# Patient Record
Sex: FEMALE | Race: ASIAN | NOT HISPANIC OR LATINO | Employment: FULL TIME | URBAN - METROPOLITAN AREA
[De-identification: names, ages, dates, MRNs, and addresses within clinical notes are randomized per-mention and may not be internally consistent; named-entity substitution may affect disease eponyms.]

---

## 2017-12-20 ENCOUNTER — TRANSFERRED RECORDS (OUTPATIENT)
Dept: HEALTH INFORMATION MANAGEMENT | Facility: CLINIC | Age: 62
End: 2017-12-20

## 2017-12-28 ENCOUNTER — TRANSFERRED RECORDS (OUTPATIENT)
Dept: HEALTH INFORMATION MANAGEMENT | Facility: CLINIC | Age: 62
End: 2017-12-28

## 2017-12-29 ENCOUNTER — MEDICAL CORRESPONDENCE (OUTPATIENT)
Dept: HEALTH INFORMATION MANAGEMENT | Facility: CLINIC | Age: 62
End: 2017-12-29

## 2017-12-29 ENCOUNTER — TRANSFERRED RECORDS (OUTPATIENT)
Dept: HEALTH INFORMATION MANAGEMENT | Facility: CLINIC | Age: 62
End: 2017-12-29

## 2018-01-24 ENCOUNTER — OFFICE VISIT (OUTPATIENT)
Dept: FAMILY MEDICINE | Facility: CLINIC | Age: 63
End: 2018-01-24
Attending: FAMILY MEDICINE
Payer: COMMERCIAL

## 2018-01-24 VITALS
WEIGHT: 131.3 LBS | HEART RATE: 65 BPM | HEIGHT: 64 IN | BODY MASS INDEX: 22.42 KG/M2 | DIASTOLIC BLOOD PRESSURE: 77 MMHG | SYSTOLIC BLOOD PRESSURE: 133 MMHG

## 2018-01-24 DIAGNOSIS — M81.0 AGE-RELATED OSTEOPOROSIS WITHOUT CURRENT PATHOLOGICAL FRACTURE: Primary | ICD-10-CM

## 2018-01-24 DIAGNOSIS — F43.23 ADJUSTMENT DISORDER WITH MIXED ANXIETY AND DEPRESSED MOOD: ICD-10-CM

## 2018-01-24 LAB
ANION GAP SERPL CALCULATED.3IONS-SCNC: 4 MMOL/L (ref 3–14)
BUN SERPL-MCNC: 10 MG/DL (ref 7–30)
CALCIUM SERPL-MCNC: 9.6 MG/DL (ref 8.5–10.1)
CHLORIDE SERPL-SCNC: 107 MMOL/L (ref 94–109)
CO2 SERPL-SCNC: 31 MMOL/L (ref 20–32)
CREAT SERPL-MCNC: 0.88 MG/DL (ref 0.52–1.04)
GFR SERPL CREATININE-BSD FRML MDRD: 65 ML/MIN/1.7M2
GLUCOSE SERPL-MCNC: 88 MG/DL (ref 70–99)
HGB BLD-MCNC: 13.7 G/DL (ref 11.7–15.7)
POTASSIUM SERPL-SCNC: 4.4 MMOL/L (ref 3.4–5.3)
SODIUM SERPL-SCNC: 142 MMOL/L (ref 133–144)
TSH SERPL DL<=0.005 MIU/L-ACNC: 2.67 MU/L (ref 0.4–4)

## 2018-01-24 PROCEDURE — G0463 HOSPITAL OUTPT CLINIC VISIT: HCPCS | Mod: ZF

## 2018-01-24 PROCEDURE — 84443 ASSAY THYROID STIM HORMONE: CPT | Performed by: FAMILY MEDICINE

## 2018-01-24 PROCEDURE — 80048 BASIC METABOLIC PNL TOTAL CA: CPT | Performed by: FAMILY MEDICINE

## 2018-01-24 PROCEDURE — 85018 HEMOGLOBIN: CPT | Performed by: FAMILY MEDICINE

## 2018-01-24 PROCEDURE — 82306 VITAMIN D 25 HYDROXY: CPT | Performed by: FAMILY MEDICINE

## 2018-01-24 PROCEDURE — 36415 COLL VENOUS BLD VENIPUNCTURE: CPT | Performed by: FAMILY MEDICINE

## 2018-01-24 RX ORDER — ALENDRONATE SODIUM 70 MG/1
TABLET ORAL
Qty: 12 TABLET | Refills: 1 | Status: SHIPPED | OUTPATIENT
Start: 2018-01-24

## 2018-01-24 NOTE — MR AVS SNAPSHOT
After Visit Summary   1/24/2018    Anum Forbes    MRN: [id number]           Patient Information     Date Of Birth          1955        Visit Information        Provider Department      1/24/2018 2:20 PM Vazquez Ibarra MD [facility]        Today's Diagnoses     Age-related osteoporosis without current pathological fracture    -  1    Adjustment disorder with mixed anxiety and depressed mood           Follow-ups after your visit        Additional Services     MENTAL HEALTH REFERRAL  - Adult; Psychiatry and Medication Management; Psychiatry; Other: Not Listed - Enter Referral Details in Scheduling Comments Below; Patient call to schedule       All scheduling is subject to the client's specific insurance plan & benefits, provider/location availability, and provider clinical specialities.  Please arrive 15 minutes early for your first appointment and bring your completed paperwork.   Refer to Dr. Elmer Morris, psychiatrist or psychologist at [place]    Please be aware that coverage of these services is subject to the terms and limitations of your health insurance plan.  Call member services at your health plan with any benefit or coverage questions.                  Follow-up notes from your care team     Return in about 3 months (around 4/24/2018).      Who to contact     Please call your clinic at [phone] to:    Ask questions about your health    Make or cancel appointments    Discuss your medicines    Learn about your test results    Speak to your doctor   If you have compliments or concerns about an experience at your clinic, or if you wish to file a complaint, please contact [facility] Patient Relations at [phone] or email us at [email or web address]         Additional Information About Your Visit        MyChart Information     [employer] is an electronic gateway that provides easy, online access to your medical records.  "With [facility], you can request a clinic appointment, read your test results, renew a prescription or communicate with your care team.     To sign up for [facility] [facility] visit the website at [email or web address]   You will be asked to enter the access code listed below, as well as some personal information. Please follow the directions to create your username and password.     Your access code is: [email or web address]  Expires: 2018  6:30 AM     Your access code will  in 90 days. If you need help or a new code, please contact your [facility] or call [phone] for assistance.        Care EveryWhere ID     This is your Care EveryWhere ID. This could be used by other organizations to access your [facility] medical records  [id number]        Your Vitals Were     Pulse Height BMI (Body Mass Index)             65 1.626 m (5' 4.02\") 22.53 kg/m2          Blood Pressure from Last 3 Encounters:   18 133/77    Weight from Last 3 Encounters:   18 59.6 kg (131 lb 4.8 oz)   13 59 kg (130 lb)   13 59 kg (130 lb)              We Performed the Following     25- OH-Vitamin D     Basic Metabolic Panel     Hemoglobin     MENTAL HEALTH REFERRAL  - Adult; Psychiatry and Medication Management; Psychiatry; Other: Not Listed - Enter Referral Details in Scheduling Comments Below; Patient call to schedule     TSH with free T4 reflex          Today's Medication Changes          These changes are accurate as of 18 11:59 PM.  If you have any questions, ask your nurse or doctor.               Start taking these medicines.        Dose/Directions    alendronate 70 MG tablet   Commonly known as:  FOSAMAX   Used for:  Age-related osteoporosis without current pathological fracture   Started by:  Vazquez Ibarra MD        Take 1 tablet (70 mg) by mouth with 8oz water every 7 days 30 minutes before breakfast and remain upright during this time.   Quantity:  12 tablet   Refills:  1          "   Where to get your medicines      These medications were sent to [id number] - [place], [place] - [address]  [address], [facility] [address]     Phone:  [phone]     alendronate 70 MG tablet                Primary Care Provider Fax #    Physician No Ref-Primary [phone]       No address on file        Equal Access to Services     [facility] Center[facility] : Hadii briana ku hadasho Soomaali, waaxda luqadaha, qaybta kaalmada adeegyada, benjamin erickson hayaan adekristen aliciasowmyaann devi . So [facility] [phone].    ATENCIÓN: Si habla español, tiene a alarcon disposición servicios gratuitos de asistencia lingüística. Llame al [phone].    We comply with applicable federal civil rights laws and [place] laws. We do not discriminate on the basis of race, color, national origin, age, disability, sex, sexual orientation, or gender identity.            Thank you!     Thank you for choosing [facility]  for your care. Our goal is always to provide you with excellent care. Hearing back from our patients is one way we can continue to improve our services. Please take a few minutes to complete the written survey that you may receive in the mail after your visit with us. Thank you!             Your Updated Medication List - Protect others around you: Learn how to safely use, store and throw away your medicines at www.disposemymeds.org.          This list is accurate as of 1/24/18 11:59 PM.  Always use your most recent med list.                   Brand Name Dispense Instructions for use Diagnosis    alendronate 70 MG tablet    FOSAMAX    12 tablet    Take 1 tablet (70 mg) by mouth with 8oz water every 7 days 30 minutes before breakfast and remain upright during this time.    Age-related osteoporosis without current pathological fracture       desoximetasone 0.25 % Oint ointment    TOPICORT    60 g    Apply  topically 2 times daily. Please compound with 75% desoximetasome 0.25% cream.  1:3 ointment  to cream.    Eczema       gabapentin 100 MG capsule    NEURONTIN    189 capsule    One cap TID for 7 days, then 2 caps TID for 7 days and then 3 TID    Lumbago       Urea 40 % Crea     60 g    Daily as needed as instructed at your appointment.    Dermatitis       Urea in Ammonium Lactate 20 % Crea     45 g    Externally apply  topically At Bedtime. Small amount to feet daily.    Eczema

## 2018-01-24 NOTE — LETTER
1/24/2018       RE: Anum Forbes  [address]  [facility] [address]     Dear Colleague,    Thank you for referring your patient, Anum Forbes, to the [facility] at [facility]. Please see a copy of my visit note below.    HPI  Pt. Is seen usually at [facility] for primary care, here for bone health, mood and cyst    1. Osteoporosis. Pt. Was seen by Stacy Iyer NP at [facility] for routine care, recommend DEXA scan, had on 12/28/17  DEXA scan done  in [place], pt. Brought in for review and reviewed with patient  L hip T score -2.54, R hip -2.34, lumbar spine -0.8   LMP age 57. Eats very little dairy, no lactose intolerance. Does not take Vitamin D supplement. Never smoker  She has known degenerative disc disease in back, but no thyroid issues or HTN, takes no regular medications, no history of prolonged steroid use.  Mother had fracture (hip?) after MVA at older age  No personal history fractures.   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 She experiences increased anxiety,getting used to cultural differences, especially in workplace. new culture, etc.    She was let go from work 4 days ago, with  susequent depressive symptoms: diffculty falling and staying asleep, low appetite, worthlessness, feeling slow, trouble concentrate. +SI. She has no plans, thinks not worth it, but feels hopeless.  She lives with chandra,  supportive and will put her on his insuarance, recommended that she continue to  Work or engage in training. She can apply for unemployment. She is a [place] citizen, and does not anticipate financial concerns  She does not tend to  go out or talk to people a lot; has in-laws  Currently taking OTC sleeping pill each night  No ETOH use, no substances    Past  "history:  Prior episode depression 2012, related to work. Took medication, unknown type, took off 2 months  Has had therapist in past.     2012, had depression episode again related to work situation  Took medications, took 2 months off.  What sleeping pill OTC, 1 tab each night  ETOH--no  No substances   GAD7=14, predates job loss  Therapist in past--  No history of manic symptoms    Past Medical History:   Diagnosis Date     Depression      Active medical problems  Eczema        ROS  + superficial mass in L wrist nontender   12 point ROS negative except where noted above      Physical Exam  Blood pressure 133/77, pulse 65, height 1.626 m (5' 4.02\"), weight 59.6 kg (131 lb 4.8 oz).    Alertness:  alert   Appearance:  well groomed  Behavior/Demeanor:  cooperative and pleasant, with good  eye contact.  Speech:  normal  Psychomotor:  normal or unremarkable and sits forward or near front of chair    Mood:  depressed  Affect:  tearful and appropriate and was congruent to speech content.  Thought Process/Associations: unremarkable   Thought Content: devoid of  suicidal ideation without plan; without intent [details in Interim History].   Perception: devoid of  auditory hallucinations without commands [details in Interim History]  Insight:  good.  Judgment: good.  Attention/Concentration:  Normal  Language:  Intact  Fund of Knowledge:  Above average.    Memory:  Immediate recall intact, Short-term memory intact and Long-term memory intact.      These cognitive functions grossly appear as described, but were not formally tested.    3-4mm superficial nontender discrete mass volar surface of wrist    A/P  1. Osteoporosis  Discussed the nature and treatment of osteoporosis with patient. Risk factors include post menopausal status, ethnicity, lack of Vit D/calcium in diet, possible family history.  Counseled about treatment options. Will start Fosamax 70 mg daily, instructed patent how to take. Will check Vit D, BMP  If " normal, recommend 600-1000 units daily, along with regular weight bearing activity    2. Adjustment disorder with significant anxiety and depressive symptoms  More severe depressive symptoms since job loss. While +SI present, no plans and no history of attempts  Counseled about nature and treatment of mood disorders, options for treatment. Will check TSH, Hgb,   referral to Dr. Elmer Morris, psychiatrist at [place].   3. Ganglion cyst--educated patient and reassurance given, no intervention desired at this time    If any trouble starting mental health care at [place], to call  Otherwise f/u 3 months for osteoporosis      Again, thank you for allowing me to participate in the care of your patient.      Sincerely,    Vazquez Ibarra MD

## 2018-01-24 NOTE — LETTER
2/7/2018         Anum Forbes   [address] [place] [facility] [address]        Dear Ms. Forbes:    The results of your recent test(s) listed below were normal, except your Vitamin D level is borderline low. I would recommend that you take a 1000 unit Vitamin d supplement daily.    Results for orders placed or performed in visit on 01/24/18   TSH with free T4 reflex   Result Value Ref Range    TSH 2.67 0.40 - 4.00 mU/L   Hemoglobin   Result Value Ref Range    Hemoglobin 13.7 11.7 - 15.7 g/dL   25- OH-Vitamin D   Result Value Ref Range    Vitamin D Deficiency screening 21 20 - 75 ug/L   Basic Metabolic Panel   Result Value Ref Range    Sodium 142 133 - 144 mmol/L    Potassium 4.4 3.4 - 5.3 mmol/L    Chloride 107 94 - 109 mmol/L    Carbon Dioxide 31 20 - 32 mmol/L    Anion Gap 4 3 - 14 mmol/L    Glucose 88 70 - 99 mg/dL    Urea Nitrogen 10 7 - 30 mg/dL    Creatinine 0.88 0.52 - 1.04 mg/dL    GFR Estimate 65 >60 mL/min/1.7m2    GFR Estimate If Black 79 >60 mL/min/1.7m2    Calcium 9.6 8.5 - 10.1 mg/dL         Please note that test explanations are brief and do not reflect all diagnostic uses.  If you have any questions or concerns, please call the clinic at [phone].      Sincerely,      Vazquez Ibarra MD

## 2018-01-24 NOTE — PROGRESS NOTES
HPI  Pt. Is seen usually at [facility] for primary care, here for bone health, mood and cyst    1. Osteoporosis. Pt. Was seen by Stacy Iyer NP at [facility] for routine care, recommend DEXA scan, had on 12/28/17  DEXA scan done  in [place], pt. Brought in for review and reviewed with patient  L hip T score -2.54, R hip -2.34, lumbar spine -0.8   LMP age 57. Eats very little dairy, no lactose intolerance. Does not take Vitamin D supplement. Never smoker  She has known degenerative disc disease in back, but no thyroid issues or HTN, takes no regular medications, no history of prolonged steroid use.  Mother had fracture (hip?) after MVA at older age  No personal history fractures.   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 She experiences increased anxiety,getting used to cultural differences, especially in workplace. new culture, etc.    She was let go from work 4 days ago, with  susequent depressive symptoms: diffculty falling and staying asleep, low appetite, worthlessness, feeling slow, trouble concentrate. +SI. She has no plans, thinks not worth it, but feels hopeless.  She lives with chandra,  supportive and will put her on his insuarance, recommended that she continue to  Work or engage in training. She can apply for unemployment. She is a [place], and does not anticipate financial concerns  She does not tend to  go out or talk to people a lot; has in-laws  Currently taking OTC sleeping pill each night  No ETOH use, no substances    Past history:  Prior episode depression 2012, related to work. Took medication, unknown type, took off 2 months  Has had therapist in past.     2012, had depression episode again related to work situation  Took medications, took 2 months off.  What sleeping pill OTC, 1 tab each night  ETOH--no  No  "substances   GAD7=14, predates job loss  Therapist in past--  No history of manic symptoms    Past Medical History:   Diagnosis Date     Depression      Active medical problems  Eczema        ROS  + superficial mass in L wrist nontender   12 point ROS negative except where noted above      Physical Exam  Blood pressure 133/77, pulse 65, height 1.626 m (5' 4.02\"), weight 59.6 kg (131 lb 4.8 oz).    Alertness:  alert   Appearance:  well groomed  Behavior/Demeanor:  cooperative and pleasant, with good  eye contact.  Speech:  normal  Psychomotor:  normal or unremarkable and sits forward or near front of chair    Mood:  depressed  Affect:  tearful and appropriate and was congruent to speech content.  Thought Process/Associations: unremarkable   Thought Content: devoid of  suicidal ideation without plan; without intent [details in Interim History].   Perception: devoid of  auditory hallucinations without commands [details in Interim History]  Insight:  good.  Judgment: good.  Attention/Concentration:  Normal  Language:  Intact  Fund of Knowledge:  Above average.    Memory:  Immediate recall intact, Short-term memory intact and Long-term memory intact.      These cognitive functions grossly appear as described, but were not formally tested.    3-4mm superficial nontender discrete mass volar surface of wrist    A/P  1. Osteoporosis  Discussed the nature and treatment of osteoporosis with patient. Risk factors include post menopausal status, ethnicity, lack of Vit D/calcium in diet, possible family history.  Counseled about treatment options. Will start Fosamax 70 mg daily, instructed patent how to take. Will check Vit D, BMP  If normal, recommend 600-1000 units daily, along with regular weight bearing activity    2. Adjustment disorder with significant anxiety and depressive symptoms  More severe depressive symptoms since job loss. While +SI present, no plans and no history of attempts  Counseled about nature and treatment " of mood disorders, options for treatment. Will check TSH, Hgb,   referral to Dr. Elmer Morris, psychiatrist at [facility].   3. Ganglion cyst--educated patient and reassurance given, no intervention desired at this time    If any trouble starting mental health care at [place], to call  Otherwise f/u 3 months for osteoporosis

## 2018-01-24 NOTE — LETTER
Date:February 1, 2018      Patient was self referred, no letter generated. Do not send.        [facility] Health Information

## 2018-01-25 LAB — DEPRECATED CALCIDIOL+CALCIFEROL SERPL-MC: 21 UG/L (ref 20–75)

## 2018-12-18 ENCOUNTER — ANCILLARY PROCEDURE (OUTPATIENT)
Dept: MAMMOGRAPHY | Facility: CLINIC | Age: 63
End: 2018-12-18
Payer: COMMERCIAL

## 2018-12-18 ENCOUNTER — ANCILLARY PROCEDURE (OUTPATIENT)
Dept: BONE DENSITY | Facility: CLINIC | Age: 63
End: 2018-12-18
Payer: COMMERCIAL

## 2018-12-18 DIAGNOSIS — Z00.00 ENCOUNTER FOR WELLNESS EXAMINATION: ICD-10-CM

## 2018-12-18 DIAGNOSIS — M81.0 OSTEOPOROSIS: ICD-10-CM

## 2020-10-22 ENCOUNTER — MEDICAL CORRESPONDENCE (OUTPATIENT)
Dept: HEALTH INFORMATION MANAGEMENT | Facility: CLINIC | Age: 65
End: 2020-10-22

## 2020-10-22 ENCOUNTER — TRANSFERRED RECORDS (OUTPATIENT)
Dept: HEALTH INFORMATION MANAGEMENT | Facility: CLINIC | Age: 65
End: 2020-10-22

## 2020-10-22 ENCOUNTER — TRANSCRIBE ORDERS (OUTPATIENT)
Dept: GASTROENTEROLOGY | Facility: CLINIC | Age: 65
End: 2020-10-22

## 2020-10-22 DIAGNOSIS — Z00.00 ENCOUNTER FOR WELL ADULT EXAM WITHOUT ABNORMAL FINDINGS: Primary | ICD-10-CM

## 2020-10-23 ENCOUNTER — TRANSFERRED RECORDS (OUTPATIENT)
Dept: HEALTH INFORMATION MANAGEMENT | Facility: CLINIC | Age: 65
End: 2020-10-23

## 2020-10-25 DIAGNOSIS — Z11.59 ENCOUNTER FOR SCREENING FOR OTHER VIRAL DISEASES: Primary | ICD-10-CM

## 2020-12-04 ENCOUNTER — TELEPHONE (OUTPATIENT)
Dept: GASTROENTEROLOGY | Facility: OUTPATIENT CENTER | Age: 65
End: 2020-12-04

## 2020-12-08 DIAGNOSIS — Z11.59 ENCOUNTER FOR SCREENING FOR OTHER VIRAL DISEASES: ICD-10-CM

## 2020-12-08 PROCEDURE — U0003 INFECTIOUS AGENT DETECTION BY NUCLEIC ACID (DNA OR RNA); SEVERE ACUTE RESPIRATORY SYNDROME CORONAVIRUS 2 (SARS-COV-2) (CORONAVIRUS DISEASE [COVID-19]), AMPLIFIED PROBE TECHNIQUE, MAKING USE OF HIGH THROUGHPUT TECHNOLOGIES AS DESCRIBED BY CMS-2020-01-R: HCPCS | Performed by: INTERNAL MEDICINE

## 2020-12-09 ENCOUNTER — TELEPHONE (OUTPATIENT)
Dept: GASTROENTEROLOGY | Facility: CLINIC | Age: 65
End: 2020-12-09

## 2020-12-09 LAB
SARS-COV-2 RNA SPEC QL NAA+PROBE: NOT DETECTED
SPECIMEN SOURCE: NORMAL

## 2020-12-09 NOTE — TELEPHONE ENCOUNTER
Pt requests communication via unencrypted e-mail. This includes colonoscopy prep instructions.  Pt acknowledges that they have agreed to accept the risks and receive unencrypted communications for this incidence.

## 2020-12-11 ENCOUNTER — HOSPITAL ENCOUNTER (OUTPATIENT)
Facility: AMBULATORY SURGERY CENTER | Age: 65
Discharge: HOME | End: 2020-12-11
Attending: INTERNAL MEDICINE | Admitting: INTERNAL MEDICINE
Payer: COMMERCIAL

## 2020-12-11 VITALS
SYSTOLIC BLOOD PRESSURE: 117 MMHG | WEIGHT: 135 LBS | BODY MASS INDEX: 22.49 KG/M2 | HEART RATE: 54 BPM | OXYGEN SATURATION: 100 % | TEMPERATURE: 97.3 F | DIASTOLIC BLOOD PRESSURE: 65 MMHG | HEIGHT: 65 IN | RESPIRATION RATE: 16 BRPM

## 2020-12-11 LAB — COLONOSCOPY: NORMAL

## 2020-12-11 PROCEDURE — 45378 DIAGNOSTIC COLONOSCOPY: CPT

## 2020-12-11 PROCEDURE — 45378 DIAGNOSTIC COLONOSCOPY: CPT | Performed by: INTERNAL MEDICINE

## 2020-12-11 RX ORDER — LIDOCAINE 40 MG/G
CREAM TOPICAL
Status: DISCONTINUED | OUTPATIENT
Start: 2020-12-11 | End: 2020-12-11 | Stop reason: HOSPADM

## 2020-12-11 RX ORDER — ONDANSETRON 2 MG/ML
4 INJECTION INTRAMUSCULAR; INTRAVENOUS
Status: DISCONTINUED | OUTPATIENT
Start: 2020-12-11 | End: 2020-12-11 | Stop reason: HOSPADM

## 2020-12-11 RX ORDER — PROCHLORPERAZINE MALEATE 10 MG
10 TABLET ORAL EVERY 6 HOURS PRN
Status: DISCONTINUED | OUTPATIENT
Start: 2020-12-11 | End: 2020-12-12 | Stop reason: HOSPADM

## 2020-12-11 RX ORDER — ONDANSETRON 4 MG/1
4 TABLET, ORALLY DISINTEGRATING ORAL EVERY 6 HOURS PRN
Status: DISCONTINUED | OUTPATIENT
Start: 2020-12-11 | End: 2020-12-12 | Stop reason: HOSPADM

## 2020-12-11 RX ORDER — ONDANSETRON 2 MG/ML
4 INJECTION INTRAMUSCULAR; INTRAVENOUS EVERY 6 HOURS PRN
Status: DISCONTINUED | OUTPATIENT
Start: 2020-12-11 | End: 2020-12-12 | Stop reason: HOSPADM

## 2020-12-11 RX ORDER — NALOXONE HYDROCHLORIDE 0.4 MG/ML
0.2 INJECTION, SOLUTION INTRAMUSCULAR; INTRAVENOUS; SUBCUTANEOUS
Status: DISCONTINUED | OUTPATIENT
Start: 2020-12-11 | End: 2020-12-12 | Stop reason: HOSPADM

## 2020-12-11 RX ORDER — FENTANYL CITRATE 50 UG/ML
INJECTION, SOLUTION INTRAMUSCULAR; INTRAVENOUS PRN
Status: DISCONTINUED | OUTPATIENT
Start: 2020-12-11 | End: 2020-12-11 | Stop reason: HOSPADM

## 2020-12-11 RX ORDER — NALOXONE HYDROCHLORIDE 0.4 MG/ML
0.4 INJECTION, SOLUTION INTRAMUSCULAR; INTRAVENOUS; SUBCUTANEOUS
Status: DISCONTINUED | OUTPATIENT
Start: 2020-12-11 | End: 2020-12-12 | Stop reason: HOSPADM

## 2020-12-11 RX ORDER — FLUMAZENIL 0.1 MG/ML
0.2 INJECTION, SOLUTION INTRAVENOUS
Status: ACTIVE | OUTPATIENT
Start: 2020-12-11 | End: 2020-12-11

## 2020-12-11 ASSESSMENT — MIFFLIN-ST. JEOR: SCORE: 1168.24

## 2020-12-11 NOTE — DISCHARGE INSTRUCTIONS
Discharge Instructions after Colonoscopy  or Sigmoidoscopy    Today you had a _x___ Colonoscopy ____ Sigmoidoscopy    Activity and Diet  You were given medicine for pain. You may be dizzy or sleepy.  For 24 hours:    Do not drive or use heavy equipment.    Do not make important decisions.    Do not drink any alcohol.  You may return to your normal diet and medicines.    Discomfort    Air was placed in your colon during the exam in order to see it. Walking helps to pass the air.    You may take Tylenol (acetaminophen) for pain unless your doctor has told you not to.  Do not take aspirin or ibuprofen (Advil, Motrin, or other anti-inflammatory  drugs) for _3____ days.    Follow-up  ____ We took small tissue samples or polyps to study. Your doctor will call you with the results  within two weeks.    When to call:    Call right away if you have:    Unusual pain in belly or chest pain not relieved with passing air.    More than 1 to 2 Tablespoons of bleeding from your rectum.    Fever above 100.6  F (37.5  C).    If you have severe pain, bleeding, or shortness of breath, go to an emergency room.    If you have questions, call:  Monday to Friday, 7 a.m. to 4:30 p.m.  Endoscopy: [phone] (We may have to call you back)    After hours  Hospital: [phone] (Ask for the GI fellow on call)

## 2021-04-10 ENCOUNTER — IMMUNIZATION (OUTPATIENT)
Dept: NURSING | Facility: CLINIC | Age: 66
End: 2021-04-10
Payer: COMMERCIAL

## 2021-04-10 PROCEDURE — 91301 PR COVID VAC MODERNA 100 MCG/0.5 ML IM: CPT

## 2021-04-10 PROCEDURE — 0011A PR COVID VAC MODERNA 100 MCG/0.5 ML IM: CPT

## 2021-05-08 ENCOUNTER — IMMUNIZATION (OUTPATIENT)
Dept: NURSING | Facility: CLINIC | Age: 66
End: 2021-05-08
Attending: PHARMACIST
Payer: COMMERCIAL

## 2021-05-08 PROCEDURE — 0012A PR COVID VAC MODERNA 100 MCG/0.5 ML IM: CPT

## 2021-05-08 PROCEDURE — 91301 PR COVID VAC MODERNA 100 MCG/0.5 ML IM: CPT

## 2021-05-23 ENCOUNTER — HEALTH MAINTENANCE LETTER (OUTPATIENT)
Age: 66
End: 2021-05-23

## 2021-07-22 ENCOUNTER — ANCILLARY PROCEDURE (OUTPATIENT)
Dept: BONE DENSITY | Facility: CLINIC | Age: 66
End: 2021-07-22
Attending: FAMILY MEDICINE
Payer: COMMERCIAL

## 2021-07-22 ENCOUNTER — MEDICAL CORRESPONDENCE (OUTPATIENT)
Dept: HEALTH INFORMATION MANAGEMENT | Facility: CLINIC | Age: 66
End: 2021-07-22

## 2021-07-22 DIAGNOSIS — M81.8 OTHER OSTEOPOROSIS WITHOUT CURRENT PATHOLOGICAL FRACTURE: ICD-10-CM

## 2021-07-22 DIAGNOSIS — M81.0 OSTEOPOROSIS: ICD-10-CM

## 2021-07-22 PROCEDURE — 77081 DXA BONE DENSITY APPENDICULR: CPT | Mod: 59 | Performed by: RADIOLOGY

## 2021-07-22 PROCEDURE — 77080 DXA BONE DENSITY AXIAL: CPT | Performed by: RADIOLOGY

## 2021-09-12 ENCOUNTER — HEALTH MAINTENANCE LETTER (OUTPATIENT)
Age: 66
End: 2021-09-12

## 2022-06-18 ENCOUNTER — HEALTH MAINTENANCE LETTER (OUTPATIENT)
Age: 67
End: 2022-06-18

## 2022-10-11 ENCOUNTER — HOSPITAL ENCOUNTER (EMERGENCY)
Facility: CLINIC | Age: 67
Discharge: HOME | End: 2022-10-11
Attending: EMERGENCY MEDICINE | Admitting: EMERGENCY MEDICINE
Payer: COMMERCIAL

## 2022-10-11 VITALS
TEMPERATURE: 97.6 F | DIASTOLIC BLOOD PRESSURE: 76 MMHG | RESPIRATION RATE: 16 BRPM | SYSTOLIC BLOOD PRESSURE: 126 MMHG | BODY MASS INDEX: 22.8 KG/M2 | WEIGHT: 137 LBS | OXYGEN SATURATION: 98 % | HEART RATE: 62 BPM

## 2022-10-11 DIAGNOSIS — N30.01 ACUTE CYSTITIS WITH HEMATURIA: ICD-10-CM

## 2022-10-11 LAB
ALBUMIN UR-MCNC: 30 MG/DL
APPEARANCE UR: ABNORMAL
BACTERIA #/AREA URNS HPF: ABNORMAL /HPF
BILIRUB UR QL STRIP: ABNORMAL
COLOR UR AUTO: ABNORMAL
GLUCOSE UR STRIP-MCNC: NEGATIVE MG/DL
HGB UR QL STRIP: ABNORMAL
KETONES UR STRIP-MCNC: NEGATIVE MG/DL
LEUKOCYTE ESTERASE UR QL STRIP: ABNORMAL
MUCOUS THREADS #/AREA URNS LPF: PRESENT /LPF
NITRATE UR QL: POSITIVE
PH UR STRIP: 6.5 [PH] (ref 5–7)
RBC URINE: 30 /HPF
SP GR UR STRIP: 1.02 (ref 1–1.03)
UROBILINOGEN UR STRIP-MCNC: 4 MG/DL
WBC CLUMPS #/AREA URNS HPF: PRESENT /HPF
WBC URINE: >182 /HPF

## 2022-10-11 PROCEDURE — 81001 URINALYSIS AUTO W/SCOPE: CPT | Performed by: EMERGENCY MEDICINE

## 2022-10-11 PROCEDURE — 99284 EMERGENCY DEPT VISIT MOD MDM: CPT | Performed by: EMERGENCY MEDICINE

## 2022-10-11 PROCEDURE — 99283 EMERGENCY DEPT VISIT LOW MDM: CPT | Performed by: EMERGENCY MEDICINE

## 2022-10-11 PROCEDURE — 87086 URINE CULTURE/COLONY COUNT: CPT | Performed by: EMERGENCY MEDICINE

## 2022-10-11 RX ORDER — CEPHALEXIN 500 MG/1
500 CAPSULE ORAL 4 TIMES DAILY
Qty: 28 CAPSULE | Refills: 0 | Status: SHIPPED | OUTPATIENT
Start: 2022-10-11 | End: 2022-10-18

## 2022-10-11 RX ORDER — LORATADINE 10 MG/1
10 TABLET ORAL DAILY
COMMUNITY

## 2022-10-11 ASSESSMENT — ENCOUNTER SYMPTOMS
FEVER: 0
FREQUENCY: 1
ABDOMINAL PAIN: 1
NAUSEA: 0
HEMATURIA: 1
VOMITING: 0
FATIGUE: 1
FLANK PAIN: 0
DYSURIA: 1
DIARRHEA: 0
BACK PAIN: 0

## 2022-10-11 ASSESSMENT — ACTIVITIES OF DAILY LIVING (ADL)
ADLS_ACUITY_SCORE: 33
ADLS_ACUITY_SCORE: 35

## 2022-10-11 NOTE — ED NOTES
After speaking to patient, patient's urine was actually orange, not red. Patient reports taking AZO and did not know that it causes urine to turn orange.

## 2022-10-11 NOTE — DISCHARGE INSTRUCTIONS
Please make an appointment to follow up with Your Primary Care Provider in 3-5 days if you have any concerns.      You will be called with the results of your urine culture if it shows that you need to be on a different antibiotic. A new prescription can be called in for you to fill, if this is the case.     Drink plenty of fluids. Take ibuprofen or tylenol or pain or fever, as needed. Take zofran for nausea, if you were prescribed this medication.    Take your antibiotics as directed.     Return to the emergency department for worsening pain, blood in your urine, fever, recurrent vomiting or inability to tolerate oral fluids, fainting, or decreased urine output.

## 2022-10-11 NOTE — ED TRIAGE NOTES
Urination discomfort for aprox 1 week with increased urinary frequency. Pt reports blood in urin this AM.      Triage Assessment     Row Name 10/11/22 0841       Triage Assessment (Adult)    Airway WDL WDL       Respiratory WDL    Respiratory WDL WDL       Skin Circulation/Temperature WDL    Skin Circulation/Temperature WDL WDL       Cardiac WDL    Cardiac WDL WDL       Peripheral/Neurovascular WDL    Peripheral Neurovascular WDL WDL       Cognitive/Neuro/Behavioral WDL    Cognitive/Neuro/Behavioral WDL WDL

## 2022-10-11 NOTE — ED PROVIDER NOTES
[facility] EMERGENCY DEPARTMENT ([facility])    10/11/22     ED 5  11:29 AM     History     Chief Complaint   Patient presents with     Dysuria     The history is provided by the patient, medical records and the spouse.     Anum Forbes is a 67 year old female who presents with urinary discomfort the past 10 days, urinary frequency, and today noted blood in her urine. She states initially the urinary discomfort wasn't too bad, drank a lot of water. Last Thursday, 5 days ago, she developed increased lower abdominal discomfort in addition to the dysuria. She felt a bit better 3 days ago, but the following day she felt more fatigued. Yesterday she went to the pharmacy and picked up some over-the-counter Azo for this. She has taken a few doses so far and her urine changed color; patient was concerned for hematuria and so presents for evaluation. Patient did not realize Azo could change the color of her urine.  states he did get her some ibuprofen to try as well. She still has urinary discomfort. She doesn't think she has vaginal bleeding. No fevers.     Past Medical History  Past Medical History:   Diagnosis Date     Depression      Past Surgical History:   Procedure Laterality Date     COLONOSCOPY N/A 12/11/2020    Procedure: COLONOSCOPY;  Surgeon: Estiven Tanner MD;  [place] [facility] [place]     alendronate (FOSAMAX) 70 MG tablet  cephALEXin (KEFLEX) 500 MG capsule  loratadine (CLARITIN) 10 MG tablet      No Known Allergies  Family History  No family history on file.  Social History   Social History     Tobacco Use     Smoking status: Never     Smokeless tobacco: Never   Substance Use Topics     Alcohol use: No     Drug use: No      Past medical history, past surgical history, medications, allergies, family history, and social history were reviewed with the patient. No additional pertinent items.       Review of Systems   Constitutional: Positive for fatigue. Negative for fever.   Gastrointestinal:  Positive for abdominal pain. Negative for diarrhea, nausea and vomiting.   Genitourinary: Positive for dysuria, frequency and hematuria. Negative for flank pain, vaginal bleeding and vaginal discharge.   Musculoskeletal: Negative for back pain.   All other systems reviewed and are negative.    A complete review of systems was performed with pertinent positives and negatives noted in the HPI, and all other systems negative.    Physical Exam   BP: 128/78  Pulse: 63  Temp: 97.6  F (36.4  C)  Resp: 16  Weight: 62.1 kg (137 lb)  SpO2: 98 %  Physical Exam  Vitals and nursing note reviewed.   Constitutional:       General: She is not in acute distress.     Appearance: Normal appearance. She is well-developed, normal weight and well-nourished. She is not ill-appearing or diaphoretic.   HENT:      Head: Normocephalic and atraumatic.      Nose: Nose normal.      Mouth/Throat:      Mouth: Mucous membranes are moist.   Eyes:      General: No scleral icterus.     Conjunctiva/sclera: Conjunctivae normal.   Cardiovascular:      Rate and Rhythm: Normal rate.   Pulmonary:      Effort: Pulmonary effort is normal. No respiratory distress.      Breath sounds: No stridor.   Abdominal:      General: There is no distension.      Palpations: Abdomen is soft.      Tenderness: There is abdominal tenderness in the suprapubic area. There is no right CVA tenderness, left CVA tenderness, guarding or rebound.   Musculoskeletal:         General: No deformity or signs of injury. Normal range of motion.      Cervical back: Normal range of motion and neck supple. No rigidity.   Skin:     General: Skin is warm and dry.      Coloration: Skin is not jaundiced or pale.   Neurological:      General: No focal deficit present.      Mental Status: She is alert and oriented to person, place, and time.   Psychiatric:         Mood and Affect: Mood and affect and mood normal.         Behavior: Behavior normal.         ED Course      Procedures                      Results for orders placed or performed during the hospital encounter of 10/11/22   UA with Microscopic reflex to Culture     Status: Abnormal    Specimen: Urine, Clean Catch   Result Value Ref Range    Color Urine Dark Yellow (A) Colorless, Straw, Light Yellow, Yellow    Appearance Urine Slightly Cloudy (A) Clear    Glucose Urine Negative Negative mg/dL    Bilirubin Urine Small (A) Negative    Ketones Urine Negative Negative mg/dL    Specific Gravity Urine 1.022 1.003 - 1.035    Blood Urine Small (A) Negative    pH Urine 6.5 5.0 - 7.0    Protein Albumin Urine 30 (A) Negative mg/dL    Urobilinogen Urine 4.0 (A) Normal, 2.0 mg/dL    Nitrite Urine Positive (A) Negative    Leukocyte Esterase Urine Large (A) Negative    Bacteria Urine Few (A) None Seen /HPF    WBC Clumps Urine Present (A) None Seen /HPF    Mucus Urine Present (A) None Seen /LPF    RBC Urine 30 (H) <=2 /HPF    WBC Urine >182 (H) <=5 /HPF    Narrative    Urine Culture ordered based on laboratory criteria     Medications - No data to display     Assessments & Plan (with Medical Decision Making)   Anum Forbes is a 67 year old female who presents with urinary discomfort the past 10 days, urinary frequency, and today noted blood in her urine.    Ddx: Hemorrhagic cystitis, discolored urine as a medication side effect, less likely vaginal bleeding    Patient nontoxic without CVA tenderness or systemic infectious symptoms.  No fevers/flank pain, nausea vomiting.  Mildly tender in the suprapubic region with urinalysis consistent with bacterial of infection and small amount of blood with RBCs.  Patient is eager to be discharged home with antibiotics.  Given the above, I do not think further imaging or blood work is indicated.  Patient given a course of Keflex for uncomplicated cystitis.  I encouraged her to follow-up with her primary care provider for recheck urine to ensure resolution of hematuria.  Detailed return precautions provided.        I have reviewed  the nursing notes. I have reviewed the findings, diagnosis, plan and need for follow up with the patient.    Discharge Medication List as of 10/11/2022 11:40 AM      START taking these medications    Details   cephALEXin (KEFLEX) 500 MG capsule Take 1 capsule (500 mg) by mouth 4 times daily for 7 days, Disp-28 capsule, R-0, E-Prescribe             Final diagnoses:   Acute cystitis with hematuria     I, Alona Francis, am serving as a trained medical scribe to document services personally performed by Enid Webber MD based on the provider's statements to me on October 11, 2022.  This document has been checked and approved by the attending provider.    I, Enid Webber MD, was physically present and have reviewed and verified the accuracy of this note documented by Alona Francis, medical scribe.      Enid Webber MD     [facility] EMERGENCY DEPARTMENT  10/11/2022     Enid Webber MD  10/11/22 2804

## 2022-10-12 LAB — BACTERIA UR CULT: ABNORMAL

## 2022-10-13 NOTE — RESULT ENCOUNTER NOTE
Final Urine Culture Report on 10/12/22  [facility] Emergency Dept discharge antibiotic prescribed: Cephalexin (Keflex) 500 mg capsule, 1 capsule (500 mg) by mouth 4 times daily for 7 days.  #1. Bacteria, >100,000 CFU/ML Escherichia coli , is SUSCEPTIBLE to Antibiotic.    No change in treatment per [facility] ED lab result Urine Culture protocol.

## 2022-10-30 ENCOUNTER — HEALTH MAINTENANCE LETTER (OUTPATIENT)
Age: 67
End: 2022-10-30

## 2022-12-12 ENCOUNTER — LAB (OUTPATIENT)
Dept: LAB | Facility: CLINIC | Age: 67
End: 2022-12-12
Attending: FAMILY MEDICINE
Payer: COMMERCIAL

## 2022-12-12 DIAGNOSIS — Z20.822 ENCOUNTER FOR LABORATORY TESTING FOR COVID-19 VIRUS: ICD-10-CM

## 2022-12-12 PROCEDURE — U0005 INFEC AGEN DETEC AMPLI PROBE: HCPCS

## 2022-12-12 PROCEDURE — U0003 INFECTIOUS AGENT DETECTION BY NUCLEIC ACID (DNA OR RNA); SEVERE ACUTE RESPIRATORY SYNDROME CORONAVIRUS 2 (SARS-COV-2) (CORONAVIRUS DISEASE [COVID-19]), AMPLIFIED PROBE TECHNIQUE, MAKING USE OF HIGH THROUGHPUT TECHNOLOGIES AS DESCRIBED BY CMS-2020-01-R: HCPCS

## 2022-12-13 LAB — SARS-COV-2 RNA RESP QL NAA+PROBE: NEGATIVE

## 2023-06-01 ENCOUNTER — HEALTH MAINTENANCE LETTER (OUTPATIENT)
Age: 68
End: 2023-06-01

## 2023-09-11 ENCOUNTER — ANCILLARY PROCEDURE (OUTPATIENT)
Dept: BONE DENSITY | Facility: CLINIC | Age: 68
End: 2023-09-11
Attending: FAMILY MEDICINE
Payer: COMMERCIAL

## 2023-09-11 ENCOUNTER — ANCILLARY PROCEDURE (OUTPATIENT)
Dept: MAMMOGRAPHY | Facility: CLINIC | Age: 68
End: 2023-09-11
Attending: FAMILY MEDICINE
Payer: COMMERCIAL

## 2023-09-11 DIAGNOSIS — M85.80 OSTEOPENIA: ICD-10-CM

## 2023-09-11 DIAGNOSIS — Z00.00 ENCOUNTER FOR WELL ADULT EXAM WITHOUT ABNORMAL FINDINGS: ICD-10-CM

## 2023-09-11 PROCEDURE — 77067 SCR MAMMO BI INCL CAD: CPT | Performed by: STUDENT IN AN ORGANIZED HEALTH CARE EDUCATION/TRAINING PROGRAM

## 2023-09-11 PROCEDURE — 77080 DXA BONE DENSITY AXIAL: CPT | Performed by: RADIOLOGY

## 2023-09-11 PROCEDURE — 77063 BREAST TOMOSYNTHESIS BI: CPT | Performed by: STUDENT IN AN ORGANIZED HEALTH CARE EDUCATION/TRAINING PROGRAM

## 2023-09-11 PROCEDURE — 99207 DX WRIST/HEEL/RADIUS: CPT | Performed by: RADIOLOGY

## 2023-11-12 ENCOUNTER — HEALTH MAINTENANCE LETTER (OUTPATIENT)
Age: 68
End: 2023-11-12

## 2024-09-17 ENCOUNTER — ANCILLARY PROCEDURE (OUTPATIENT)
Dept: MAMMOGRAPHY | Facility: CLINIC | Age: 69
End: 2024-09-17
Attending: FAMILY MEDICINE
Payer: COMMERCIAL

## 2024-09-17 DIAGNOSIS — Z00.00 ENCOUNTER FOR WELL ADULT EXAM WITHOUT ABNORMAL FINDINGS: ICD-10-CM

## 2024-09-17 PROCEDURE — 77067 SCR MAMMO BI INCL CAD: CPT | Mod: TC | Performed by: RADIOLOGY

## 2024-09-17 PROCEDURE — 77063 BREAST TOMOSYNTHESIS BI: CPT | Mod: TC | Performed by: RADIOLOGY

## 2024-10-11 ENCOUNTER — ANCILLARY PROCEDURE (OUTPATIENT)
Dept: CT IMAGING | Facility: CLINIC | Age: 69
End: 2024-10-11
Attending: FAMILY MEDICINE
Payer: COMMERCIAL

## 2024-10-11 DIAGNOSIS — R51.9 HEAD ACHE: ICD-10-CM

## 2024-10-11 PROCEDURE — 70470 CT HEAD/BRAIN W/O & W/DYE: CPT | Mod: GC | Performed by: RADIOLOGY

## 2024-10-11 RX ORDER — IOPAMIDOL 755 MG/ML
70 INJECTION, SOLUTION INTRAVASCULAR ONCE
Status: COMPLETED | OUTPATIENT
Start: 2024-10-11 | End: 2024-10-11

## 2024-10-11 RX ADMIN — IOPAMIDOL 70 ML: 755 INJECTION, SOLUTION INTRAVASCULAR at 12:58

## 2024-10-11 NOTE — DISCHARGE INSTRUCTIONS

## 2024-10-26 ENCOUNTER — NURSE TRIAGE (OUTPATIENT)
Dept: NURSING | Facility: CLINIC | Age: 69
End: 2024-10-26
Payer: COMMERCIAL

## 2024-10-26 NOTE — TELEPHONE ENCOUNTER
Nurse Triage SBAR    Is this a 2nd Level Triage? NO    Situation: Results    Background: Patient had CT and blood work done on 10/10/24.    Assessment: Patient calling back to find out results of lab and radiology.    Protocol Recommended Disposition:   Call PCP Within 24 Hours    Recommendation: According to the protocol, patient should call [facility] when open on Monday.  Care advice given. Patient verbalizes understanding and agrees with plan of care.     Ashley Perez RN  10/26/24 12:18 PM  [facility] Nurse Advisor    Reason for Disposition   Caller requesting lab results  (Exception: Routine or non-urgent lab result.)    Additional Information   Negative: Lab calling with strep throat test results and triager can call in prescription   Negative: Lab calling with urinalysis test results and triager can call in prescription   Negative: Medication questions   Negative: Medication renewal and refill questions   Negative: Pre-operative or pre-procedural questions   Negative: ED call to PCP (i.e., primary care provider; doctor, NP, or PA)   Negative: Doctor (or NP/PA) call to PCP   Negative: Call about patient who is currently hospitalized   Negative: Lab or radiology calling with CRITICAL test results   Negative: [1] Follow-up call from patient regarding patient's clinical status AND [2] information urgent   Negative: [1] Caller requests to speak ONLY to PCP AND [2] URGENT question   Negative: [1] Caller requests to speak to PCP now AND [2] won't tell us reason for call  (Exception: If 10 pm to 6 am, caller must first discuss reason for the call.)   Negative: Notification of hospital admission   Negative: Notification of death    Protocols used: [facility]

## 2024-12-28 ENCOUNTER — HEALTH MAINTENANCE LETTER (OUTPATIENT)
Age: 69
End: 2024-12-28

## 2025-03-01 ENCOUNTER — HEALTH MAINTENANCE LETTER (OUTPATIENT)
Age: 70
End: 2025-03-01

## (undated) DEVICE — GOWN IMPERVIOUS 2XL BLUE

## (undated) DEVICE — SUCTION MANIFOLD NEPTUNE 2 SYS 1 PORT 702-025-000

## (undated) DEVICE — SPECIMEN CONTAINER 3OZ W/FORMALIN 59901

## (undated) DEVICE — TUBING SUCTION 12"X1/4" N612

## (undated) DEVICE — SOL WATER IRRIG 1000ML BOTTLE 2F7114

## (undated) RX ORDER — FENTANYL CITRATE 50 UG/ML
INJECTION, SOLUTION INTRAMUSCULAR; INTRAVENOUS
Status: DISPENSED
Start: 2020-12-11